# Patient Record
Sex: FEMALE | Race: WHITE | NOT HISPANIC OR LATINO | URBAN - METROPOLITAN AREA
[De-identification: names, ages, dates, MRNs, and addresses within clinical notes are randomized per-mention and may not be internally consistent; named-entity substitution may affect disease eponyms.]

---

## 2018-10-01 ASSESSMENT — MIFFLIN-ST. JEOR: SCORE: 1264.17

## 2018-10-04 ENCOUNTER — SURGERY - HEALTHEAST (OUTPATIENT)
Dept: OBGYN | Facility: CLINIC | Age: 38
End: 2018-10-04

## 2018-10-05 ENCOUNTER — ANESTHESIA - HEALTHEAST (OUTPATIENT)
Dept: SURGERY | Facility: CLINIC | Age: 38
End: 2018-10-05

## 2018-10-05 ENCOUNTER — SURGERY - HEALTHEAST (OUTPATIENT)
Dept: SURGERY | Facility: CLINIC | Age: 38
End: 2018-10-05

## 2018-10-05 ASSESSMENT — MIFFLIN-ST. JEOR: SCORE: 1250.56

## 2021-06-02 VITALS — HEIGHT: 63 IN | BODY MASS INDEX: 24.27 KG/M2 | WEIGHT: 137 LBS

## 2021-06-20 NOTE — ANESTHESIA CARE TRANSFER NOTE
Last vitals:   Vitals:    10/05/18 1437   BP: 142/77   Pulse: (!) 114   Resp: 16   Temp: 36.9  C (98.4  F)   SpO2: 100%     Patient's level of consciousness is awake  Spontaneous respirations: yes  Maintains airway independently: yes  Dentition unchanged: yes  Oropharynx: oropharynx clear of all foreign objects    QCDR Measures:  ASA# 20 - Surgical Safety Checklist: WHO surgical safety checklist completed prior to induction  PQRS# 430 - Adult PONV Prevention: 4558F - Pt received => 2 anti-emetic agents (different classes) preop & intraop  ASA# 8 - Peds PONV Prevention: NA - Not pediatric patient, not GA or 2 or more risk factors NOT present  PQRS# 424 - Susie-op Temp Management: 4559F - At least one body temp DOCUMENTED => 35.5C or 95.9F within required timeframe  PQRS# 426 - PACU Transfer Protocol: - Transfer of care checklist used  ASA# 14 - Acute Post-op Pain: ASA14B - Patient did NOT experience pain >= 7 out of 10

## 2021-06-20 NOTE — ANESTHESIA PREPROCEDURE EVALUATION
Anesthesia Evaluation      History of anesthetic complications (PDPH following SA)     Airway   Mallampati: I  Neck ROM: full   Pulmonary - negative ROS    breath sounds clear to auscultation  (-) asthma, not a smoker                         Cardiovascular - negative ROS  Exercise tolerance: > or = 10 METS  Rhythm: regular  Rate: normal,         Neuro/Psych - negative ROS     Endo/Other - negative ROS   (-) no obesity, not pregnant ( s/f lap bilateral salpingectomy )     GI/Hepatic/Renal - negative ROS   (-) GERD          Dental      Comment: Good intact dentition. Permanent retainer, bottom teeth.                        Anesthesia Plan  Planned anesthetic: general endotracheal  GETA  Decadron 10/ Zofran 4/ Propofol infusion for antiemesis   ASA 1   Induction: intravenous     Anesthesia plan special considerations: antiemetics,   Post-op plan: routine recovery

## 2021-06-20 NOTE — ANESTHESIA POSTPROCEDURE EVALUATION
Patient: Ling Morelos  LAPAROSCOPY,  BILATERAL SALPINGECTOMY  Anesthesia type: general    Patient location: PACU  Last vitals:   Vitals:    10/05/18 1610   BP: 123/68   Pulse: 60   Resp: 14   Temp:    SpO2: 96%     Post vital signs: stable  Level of consciousness: awake and responds to simple questions  Post-anesthesia pain: pain controlled  Post-anesthesia nausea and vomiting: no  Pulmonary: unassisted, return to baseline  Cardiovascular: stable and blood pressure at baseline  Hydration: adequate  Anesthetic events: no    QCDR Measures:  ASA# 11 - Susie-op Cardiac Arrest: ASA11B - Patient did NOT experience unanticipated cardiac arrest  ASA# 12 - Susie-op Mortality Rate: ASA12B - Patient did NOT die  ASA# 13 - PACU Re-Intubation Rate: ASA13B - Patient did NOT require a new airway mgmt  ASA# 10 - Composite Anes Safety: ASA10A - No serious adverse event    Additional Notes: